# Patient Record
(demographics unavailable — no encounter records)

---

## 2024-10-30 NOTE — END OF VISIT
[FreeTextEntry3] :  This note was written by Luly Cooper on (October 30, 2024) acting as a medical scribe for Dr. Kraft This note was authored by the medical scribe for me. I have reviewed, edited, and revised the note as needed. I am in agreement with the exam findings, imaging findings, and treatment plan.  Aristides Kraft MD
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
## 2024-10-30 NOTE — PHYSICAL EXAM
[Alert] : alert [Well Nourished] : well nourished [No Acute Distress] : no acute distress [Well Developed] : well developed [Normal Sclera/Conjunctiva] : normal sclera/conjunctiva [EOMI] : extra ocular movement intact [No Proptosis] : no proptosis [Thyroid Not Enlarged] : the thyroid was not enlarged [No Thyroid Nodules] : no palpable thyroid nodules [Clear to Auscultation] : lungs were clear to auscultation bilaterally [Normal S1, S2] : normal S1 and S2 [Normal Rate] : heart rate was normal [Regular Rhythm] : with a regular rhythm [No Edema] : no peripheral edema [Normal Bowel Sounds] : normal bowel sounds [Not Tender] : non-tender [Not Distended] : not distended [Soft] : abdomen soft [Normal Anterior Cervical Nodes] : no anterior cervical lymphadenopathy [No Spinal Tenderness] : no spinal tenderness [Spine Straight] : spine straight [No Stigmata of Cushings Syndrome] : no stigmata of Cushings Syndrome [Normal Gait] : normal gait [Normal Reflexes] : deep tendon reflexes were 2+ and symmetric [No Tremors] : no tremors [Oriented x3] : oriented to person, place, and time [de-identified] : Occasional heart beats
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
## 2024-10-30 NOTE — PROCEDURE
[FreeTextEntry1] : Bone Mineral Density: 10/30/2024 Indication: Comparison to 2023, assess response to medication Spine: -1.7, osteopenia, +10.1% Total hip: -2.5, osteoporosis, no significant change Femoral neck: -2.9, osteoporosis, no significant change Proximal radius: -3.7, osteoporosis, no significant change  Bone mineral density October 9, 2023  compared to 2020 to assess response to medication  spine -2.4 osteopenia -5.2% total hip -2.5 osteoporosis, no significant change Femoral neck -3.0 osteoporosis +6.7% Proximal radius -3.6 osteoporosis no significant change  Bone Density 9/30/2022 Indication vs 2021 Asses response to medication  Spine-2.0 osteoporosis , no significant change  Total Hip -2.7 osteoporosis , no significant change  Femoral Neck -3.3 osteoporosis  no significant change  Proximal Radius -3.5 osteoporosis , no significant change   Bone mineral density: 09/23/2021  Indication: vs. 2020 Spine: -2.1 osteopenia, +4.5% Total hip: -2.9 osteoporosis, no significant change Femoral neck: -3.0 osteoporosis, no significant change Proximal radius: -3.6 osteoporosis, no significant change  Bone mineral density October 13, 2020 Indication compared to 2019 assess response to medication Spine -2.4 osteopenia no significant change total hip -2.8 osteoporosis no significant change femoral neck -3.1 osteoporosis no significant change proximal radius -3.4 osteoporosis no significant change  Bone mineral density: 10/08/2019  Indication: vs 2018, assess response to medication  Spine: -2.3, osteopenia, no significant change  Total hip: -2.7, osteoporosis, no significant change  Femoral neck: -3.0, osteopenia, no significant change  Proximal radius: -3.3, osteoporosis, no significant change    Bone mineral density: 10/16/2018  Indication: vs. outside study 2017 (images not provided) Spine: -2.3 osteopenia Total hip: -2.7 osteoporosis (prior -2.5) Femoral neck: -3.0 osteoporosis  (prior-3.0) Proximal radius: L -2.9 osteoporosis, R-3.1 osteoporosis

## 2024-10-30 NOTE — PHYSICAL EXAM
[Alert] : alert [Well Nourished] : well nourished [No Acute Distress] : no acute distress [Well Developed] : well developed [Normal Sclera/Conjunctiva] : normal sclera/conjunctiva [EOMI] : extra ocular movement intact [No Proptosis] : no proptosis [Thyroid Not Enlarged] : the thyroid was not enlarged [No Thyroid Nodules] : no palpable thyroid nodules [Clear to Auscultation] : lungs were clear to auscultation bilaterally [Normal S1, S2] : normal S1 and S2 [Normal Rate] : heart rate was normal [Regular Rhythm] : with a regular rhythm [No Edema] : no peripheral edema [Normal Bowel Sounds] : normal bowel sounds [Not Tender] : non-tender [Not Distended] : not distended [Soft] : abdomen soft [Normal Anterior Cervical Nodes] : no anterior cervical lymphadenopathy [No Spinal Tenderness] : no spinal tenderness [Spine Straight] : spine straight [No Stigmata of Cushings Syndrome] : no stigmata of Cushings Syndrome [Normal Gait] : normal gait [Normal Reflexes] : deep tendon reflexes were 2+ and symmetric [No Tremors] : no tremors [Oriented x3] : oriented to person, place, and time [de-identified] : Occasional heart beats

## 2024-10-30 NOTE — ASSESSMENT
[Bisphosphonate Therapy] : Risks and benefits of bisphosphonate therapy were  discussed with the patient including gastroesophageal irritation, osteonecrosis of the jaw, and atypical femur fractures, and acute phase reaction [Bisphosphonates] : The patient was instructed to take bisphosphonates on an empty stomach with a full glass of water,and wait at least 30 minutes before eating or lying down [Levothyroxine] : The patient was instructed to take Levothyroxine on an empty stomach, separate from vitamins, and wait at least 30 minutes before eating [FreeTextEntry1] : 74 y/o female returns for a follow-up visit for osteoporosis.   Pt restarted Actonel April 2018.   BMD 9/2022 indicates that pt is not having improvement in overall bone density, stable osteoporosis in all sites.    Pt transitioned to Prolia 03/2023.  . BMD 10/2024 indicates improved osteopenia in the spine, stable osteoporosis in total hip, fem neck and prox. radius. BMD results reviewed w/pt.  Zuly buy and lizbeth   H/o hypothyroidism on Synthroid 50 mcg daily. No current sx of hyper or hypothyroidism. No local neck pain. No dysphagia or dysphonia. No raciness, shakiness, heat/cold intolerance, tiredness, or fatigue. No palpitations, tremors, or sudden weight gain/loss.  Call Dr. Avilez from Mumtaz Aragon's office for labs   F/u in 6 months

## 2024-11-05 NOTE — PHYSICAL EXAM
[Chaperone Present] : A chaperone was present in the examining room during all aspects of the physical examination [[id number]] : A chaperone was present during the pelvic exam. [Appropriately responsive] : appropriately responsive [Alert] : alert [No Acute Distress] : no acute distress [No Lymphadenopathy] : no lymphadenopathy [Regular Rate Rhythm] : regular rate rhythm [No Murmurs] : no murmurs [Clear to Auscultation B/L] : clear to auscultation bilaterally [Soft] : soft [Non-tender] : non-tender [Non-distended] : non-distended [No HSM] : No HSM [No Lesions] : no lesions [No Mass] : no mass [Oriented x3] : oriented x3 [Examination Of The Breasts] : a normal appearance [No Masses] : no breast masses were palpable [Vulvar Atrophy] : vulvar atrophy [Labia Majora] : normal [Labia Minora] : normal [Atrophy] : atrophy [Normal] : normal [Uterine Adnexae] : normal [FreeTextEntry2] :  Ina buchanan) [FreeTextEntry1] : She declines pap [FreeTextEntry9] : guaiac negative, no messes noted

## 2024-11-05 NOTE — HISTORY OF PRESENT ILLNESS
[Patient reported mammogram was normal] : Patient reported mammogram was normal [Patient reported breast sonogram was normal] : Patient reported breast sonogram was normal [Mammogramdate] : 09/2024 [BreastSonogramDate] : 09/2024 [TextBox_19] : BIRADS 1 [TextBox_25] : BIRADS 1 [PapSmeardate] : 09/2022 [TextBox_31] : CHIKI [BoneDensityDate] : 10/2024 [ColonoscopyDate] : 2022 [TextBox_43] : office 10 yrs  [Yes] : Patient has concerns regarding sex [FreeTextEntry1] : dryness

## 2024-11-05 NOTE — PLAN
[FreeTextEntry1] : Health Maintenance: BSA, calcium, vitamin D and exercise d/w pt Pap/HPV declined Advised pt to schedule colonoscopy when indicated by GI MD DXA d/w patient, following with endocrinologist Advised pt to see PCP and dermatologist annually  Vulvar dryness: Vaseline and Aquaphor to be applied Coconut oil Estradiol Cream Rxed- used this in the past.  R/B/A discussed  RTO PRN or for annual gyn exam

## 2025-05-30 NOTE — REVIEW OF SYSTEMS
[Negative] : Heme/Lymph [Feeling Tired] : feeling tired [see HPI] : see HPI [Vomiting] : vomiting [Constipation] : constipation [Diarrhea] : diarrhea [Heartburn] : heartburn [Painful Darrow] : painful Darrow [Blood in urine that you can see] : blood visible in urine [Told you have blood in urine on a urine test] : told blood was present in a urine test [History of kidney stones] : history of kidney stones [Strong urge to urinate] : strong urge to urinate

## 2025-05-30 NOTE — PHYSICAL EXAM
[General Appearance - Well Developed] : well developed [Heart Rate And Rhythm] : heart rate and rhythm were normal [] : no respiratory distress [Abdomen Soft] : soft [Normal Station and Gait] : the gait and station were normal for the patient's age [Skin Color & Pigmentation] : normal skin color and pigmentation [No Focal Deficits] : no focal deficits [de-identified] : pt deferred exam

## 2025-05-30 NOTE — HISTORY OF PRESENT ILLNESS
[FreeTextEntry1] :  CC:kidney stone  IRMA SANDS is a 74 year female who presents to the office with recent hospitalization for ?kidney stone w/ associated gross hematuria. CT scan: right renal parapelvic cysts, mild left hydro, 3mm left UVJ vs vascular calcification. MS SANDS was started on flomax, abx and senna. MS SANDS still endorses intermittent left abdominal pain after eating that resolves with tylenol/motrin. Denies any current fevers, chills, flank pain, urgency or frequency. PFDI-20=15                                [Urinary Incontinence] : no urinary incontinence [Urinary Retention] : no urinary retention [Urinary Urgency] : no urinary urgency [Urinary Frequency] : no urinary frequency [Nocturia] : no nocturia [Dysuria] : no dysuria

## 2025-05-30 NOTE — ASSESSMENT
[FreeTextEntry1] : After a discussion of her urologic history and exam, and a review the urological issues     1.kidney stone- UA/UC/cytology sent today f/u with results. continue flomax + abx. ct urogram ordered to determine if calcification is in the ureter. We discussed the strategy of observation with medical expulsive therapy (MET). In general, 70-90% of stones <5mm will pass spontaneously and 20-60% of stones 5-10 mm will pass. In appropriate patients passing small stones, we often offer up to 2  weeks to attempt to pass stones without surgery. If observation with MET is unsuccessful during this time period, if they develop any signs of infection, or the patient prefers to intervene sooner, we can proceed to definitive treatment. I explained to the patient that they should come to the ER if they develop unrelenting severe pain, significant nausea/vomiting, or fever >101.   2.chronic constipation- continue bowl regimen. f/u with GI next week    3.gross hematuria- 1x when presenting with stone. reviewed role of hematuria workup-ct urogram pending. cysto deferred-risks review  The risks and benefits of the medication(s) and/or treatment plan including various surgical therapies were discussed in detail with the patient who understands and wishes to proceed with plan. Inclusive of discussion were the impact of various therapies on sexual function, incontinence and other relevant quality of life issues. More than 50% was spent on counseling/coordination the care of the patient, regarding treatment options/surgical management.

## 2025-06-02 NOTE — HISTORY OF PRESENT ILLNESS
[FreeTextEntry1] :  CC:kidney stone  IRMA SANDS is a 74 year female who presents to the office with recent hospitalization for ?kidney stone w/ associated gross hematuria. CT scan: right renal parapelvic cysts, mild left hydro, 3mm left UVJ vs vascular calcification. MS SANDS was started on flomax, abx and senna. MS SANDS still endorses intermittent left abdominal pain after eating that resolves with tylenol/motrin. Denies any current fevers, chills, flank pain, urgency or frequency. PFDI-20=15                                [Urinary Retention] : no urinary retention [Urinary Urgency] : no urinary urgency [Urinary Frequency] : no urinary frequency [Straining] : no straining [Weak Stream] : no weak stream [Flank Pain] : no flank pain [Fever] : no fever

## 2025-06-02 NOTE — PHYSICAL EXAM
[General Appearance - Well Developed] : well developed [Heart Rate And Rhythm] : heart rate and rhythm were normal [] : no respiratory distress [Abdomen Soft] : soft [Normal Station and Gait] : the gait and station were normal for the patient's age [Skin Color & Pigmentation] : normal skin color and pigmentation [No Focal Deficits] : no focal deficits [Oriented To Time, Place, And Person] : oriented to person, place, and time [de-identified] : pt deferred exam

## 2025-06-02 NOTE — ASSESSMENT
[FreeTextEntry1] : After a discussion of her urologic history and exam, and a review the urological issues  1.kidney stone- continue flomax + abx. ct urogram ordered to determine if calcification is in the ureter. We discussed the strategy of observation with medical expulsive therapy (MET). In general, 70-90% of stones <5mm will pass spontaneously and 20-60% of stones 5-10 mm will pass. In appropriate patients passing small stones, we often offer up to 2 weeks to attempt to pass stones without surgery. If observation with MET is unsuccessful during this time period, if they develop any signs of infection, or the patient prefers to intervene sooner, we can proceed to definitive treatment. I explained to the patient that they should come to the ER if they develop unrelenting severe pain, significant nausea/vomiting, or fever >101.  2.chronic constipation- continue bowl regimen. f/u with GI next week  3.gross hematuria- 1x when presenting with stone. reviewed role of hematuria workup-ct urogram pending. cysto deferred-risks review  The risks and benefits of the medication(s) and/or treatment plan including various surgical therapies were discussed in detail with the patient who understands and wishes to proceed with plan. Inclusive of discussion were the impact of various therapies on sexual function, incontinence and other relevant quality of life issues. More than 50% was spent on counseling/coordination the care of the patient, regarding treatment options/surgical management.

## 2025-06-09 NOTE — ASSESSMENT
[FreeTextEntry1] : After a discussion of her urologic history and exam, and a review the urological issues  1.kidney stone- continue flomax + abx. ct urogram ordered to determine if calcification is in the ureter. We discussed the strategy of observation with medical expulsive therapy (MET). In general, 70-90% of stones <5mm will pass spontaneously and 20-60% of stones 5-10 mm will pass. In appropriate patients passing small stones, we often offer up to 2 weeks to attempt to pass stones without surgery. If observation with MET is unsuccessful during this time period, if they develop any signs of infection, or the patient prefers to intervene sooner, we can proceed to definitive treatment. I explained to the patient that they should come to the ER if they develop unrelenting severe pain, significant nausea/vomiting, or fever >101. Repeat CT scan (6/3/25) 3 mm left UVJ stone, slightly distended since 5/28/2025. Mild left hydroureteronephrosis.  2.chronic constipation- continue bowl regimen. f/u with GI next week  3.gross hematuria- 1x when presenting with stone. reviewed role of hematuria workup-ct urogram pending. cysto deferred-risks review  The risks and benefits of the medication(s) and/or treatment plan including various surgical therapies were discussed in detail with the patient who understands and wishes to proceed with plan. Inclusive of discussion were the impact of various therapies on sexual function, incontinence and other relevant quality of life issues. More than 50% was spent on counseling/coordination the care of the patient, regarding treatment options/surgical management.

## 2025-06-09 NOTE — HISTORY OF PRESENT ILLNESS
[FreeTextEntry1] :  CC:kidney stone  IRMA SANDS is a 74 year female who presents to the office with recent hospitalization for ?kidney stone w/ associated gross hematuria. CT scan: right renal parapelvic cysts, mild left hydro, 3mm left UVJ vs vascular calcification. MS SANDS was started on flomax, abx and senna. MS SANDS still endorses intermittent left abdominal pain after eating that resolves with tylenol/motrin. Denies any current fevers, chills, flank pain, urgency or frequency. PFDI-20=15

## 2025-06-23 NOTE — HISTORY OF PRESENT ILLNESS
[FreeTextEntry1] : CC:UTI symptoms  IRMA SANDS is a 74 year female who presents to the office with recent hospitalization for ?kidney stone w/ associated gross hematuria. CT scan: right renal parapelvic cysts, mild left hydro, 3mm left UVJ vs vascular calcification. MS SANDS passed kidney stone-was sent off for analysis . MS SANDS Denies any current fevers, chills, flank pain, or frequency. PFDI-20=15.  pt with pelvic pressure.  [Urinary Frequency] : urinary frequency [Nocturia] : no nocturia [Straining] : no straining [Weak Stream] : no weak stream

## 2025-06-23 NOTE — PHYSICAL EXAM
[General Appearance - Well Developed] : well developed [General Appearance - Well Nourished] : well nourished [] : no respiratory distress [Abdomen Soft] : soft [de-identified] : pt deferred

## 2025-06-23 NOTE — ASSESSMENT
[FreeTextEntry1] : PVR( urinary freqeuncy) 18ml  1.kidney stone- now passed.  24 hour urine ordered through [facility].  2.chronic constipation- continue bowl regimen. f/u with GI next week  3.gross hematuria- 1x when presenting with stone. reviewed role of hematuria workup-ct urogram pending. cysto deferred-risks review  3.?UTI- UA/UC sent today will f/u with results. ER warning signs reviewed.  The risks and benefits of the medication(s) and/or treatment plan including various surgical therapies were discussed in detail with the patient who understands and wishes to proceed with plan. Inclusive of discussion were the impact of various therapies on sexual function, incontinence and other relevant quality of life issues. More than 50% was spent on counseling/coordination the care of the patient, regarding treatment options/surgical management.    Plan

## 2025-07-28 NOTE — ASSESSMENT
[FreeTextEntry1] : 1.kidney stone- now passed.  24 hour urine ordered through [facility]. Reviewed results and dietary changes reviewed.   2.chronic constipation- continue bowl regimen. f/u with GI next week  3.gross hematuria- 1x when presenting with stone. reviewed role of hematuria workup-ct urogram pending. cysto deferred-risks review  4.UTI symptoms-now resolved  The risks and benefits of the medication(s) and/or treatment plan including various surgical therapies were discussed in detail with the patient who understands and wishes to proceed with plan. Inclusive of discussion were the impact of various therapies on sexual function, incontinence and other relevant quality of life issues. More than 50% was spent on counseling/coordination the care of the patient, regarding treatment options/surgical management.

## 2025-07-28 NOTE — PHYSICAL EXAM
[General Appearance - Well Developed] : well developed [General Appearance - Well Nourished] : well nourished [] : no respiratory distress [Abdomen Soft] : soft [Normal Station and Gait] : the gait and station were normal for the patient's age [Skin Color & Pigmentation] : normal skin color and pigmentation [No Focal Deficits] : no focal deficits [de-identified] : pt deferred

## 2025-07-28 NOTE — HISTORY OF PRESENT ILLNESS
[FreeTextEntry1] : CC:hx of kidney stones  IRMA SANDS is a 74 year female who presents to the office with recent hospitalization for ?kidney stone w/ associated gross hematuria.MS SANDS passed stone (6/2025) Presents today to review 24 hour urine results.  MS SANDS Denies any current fevers, chills, flank pain, or frequency. PFDI-20=15.    [Urinary Frequency] : no urinary frequency [Nocturia] : no nocturia [Straining] : no straining [Weak Stream] : no weak stream